# Patient Record
Sex: MALE | Race: WHITE | NOT HISPANIC OR LATINO
[De-identification: names, ages, dates, MRNs, and addresses within clinical notes are randomized per-mention and may not be internally consistent; named-entity substitution may affect disease eponyms.]

---

## 2017-02-22 ENCOUNTER — APPOINTMENT (OUTPATIENT)
Dept: NEUROLOGY | Facility: CLINIC | Age: 62
End: 2017-02-22

## 2017-08-18 ENCOUNTER — APPOINTMENT (OUTPATIENT)
Dept: HEART AND VASCULAR | Facility: CLINIC | Age: 62
End: 2017-08-18
Payer: MEDICARE

## 2017-08-18 VITALS — SYSTOLIC BLOOD PRESSURE: 195 MMHG | HEART RATE: 68 BPM | DIASTOLIC BLOOD PRESSURE: 93 MMHG

## 2017-08-18 VITALS — HEART RATE: 66 BPM | DIASTOLIC BLOOD PRESSURE: 44 MMHG | SYSTOLIC BLOOD PRESSURE: 83 MMHG

## 2017-08-18 DIAGNOSIS — I10 ESSENTIAL (PRIMARY) HYPERTENSION: ICD-10-CM

## 2017-08-18 DIAGNOSIS — I48.0 PAROXYSMAL ATRIAL FIBRILLATION: ICD-10-CM

## 2017-08-18 DIAGNOSIS — E11.9 TYPE 2 DIABETES MELLITUS W/OUT COMPLICATIONS: ICD-10-CM

## 2017-08-18 PROCEDURE — 99205 OFFICE O/P NEW HI 60 MIN: CPT

## 2017-08-18 PROCEDURE — 93000 ELECTROCARDIOGRAM COMPLETE: CPT

## 2017-08-18 RX ORDER — APIXABAN 5 MG/1
5 TABLET, FILM COATED ORAL
Qty: 60 | Refills: 3 | Status: COMPLETED | COMMUNITY
Start: 2017-08-18 | End: 2017-12-16

## 2017-08-18 RX ORDER — METOPROLOL SUCCINATE 100 MG/1
100 TABLET, EXTENDED RELEASE ORAL
Qty: 90 | Refills: 0 | Status: DISCONTINUED | COMMUNITY
Start: 2017-05-25

## 2017-08-18 RX ORDER — DABIGATRAN ETEXILATE MESYLATE 150 MG/1
150 CAPSULE ORAL
Qty: 60 | Refills: 0 | Status: DISCONTINUED | COMMUNITY
Start: 2017-04-01

## 2017-09-15 ENCOUNTER — TRANSCRIPTION ENCOUNTER (OUTPATIENT)
Age: 62
End: 2017-09-15

## 2017-12-22 ENCOUNTER — APPOINTMENT (OUTPATIENT)
Dept: HEART AND VASCULAR | Facility: CLINIC | Age: 62
End: 2017-12-22

## 2018-01-30 ENCOUNTER — APPOINTMENT (OUTPATIENT)
Dept: HEART AND VASCULAR | Facility: CLINIC | Age: 63
End: 2018-01-30

## 2018-02-07 ENCOUNTER — APPOINTMENT (OUTPATIENT)
Dept: OTOLARYNGOLOGY | Facility: CLINIC | Age: 63
End: 2018-02-07
Payer: MEDICARE

## 2018-02-07 VITALS
BODY MASS INDEX: 34.38 KG/M2 | DIASTOLIC BLOOD PRESSURE: 106 MMHG | HEIGHT: 63 IN | WEIGHT: 194 LBS | SYSTOLIC BLOOD PRESSURE: 176 MMHG | HEART RATE: 65 BPM

## 2018-02-07 DIAGNOSIS — D44.2 NEOPLASM OF UNCERTAIN BEHAVIOR OF PARATHYROID GLAND: ICD-10-CM

## 2018-02-07 PROCEDURE — 99204 OFFICE O/P NEW MOD 45 MIN: CPT | Mod: 25

## 2018-02-07 PROCEDURE — 31575 DIAGNOSTIC LARYNGOSCOPY: CPT

## 2018-02-07 RX ORDER — SPIRONOLACTONE 50 MG/1
TABLET ORAL
Refills: 0 | Status: ACTIVE | COMMUNITY

## 2018-02-08 ENCOUNTER — OUTPATIENT (OUTPATIENT)
Dept: OUTPATIENT SERVICES | Facility: HOSPITAL | Age: 63
LOS: 1 days | End: 2018-02-08
Payer: MEDICARE

## 2018-02-08 PROCEDURE — A9512: CPT

## 2018-02-08 PROCEDURE — A9500: CPT

## 2018-02-08 PROCEDURE — 78072 PARATHYRD PLANAR W/SPECT&CT: CPT | Mod: 26

## 2018-02-08 PROCEDURE — 78072 PARATHYRD PLANAR W/SPECT&CT: CPT

## 2018-02-12 ENCOUNTER — APPOINTMENT (OUTPATIENT)
Dept: CT IMAGING | Facility: HOSPITAL | Age: 63
End: 2018-02-12

## 2018-02-12 ENCOUNTER — OUTPATIENT (OUTPATIENT)
Dept: OUTPATIENT SERVICES | Facility: HOSPITAL | Age: 63
LOS: 1 days | End: 2018-02-12
Payer: MEDICARE

## 2018-02-12 PROCEDURE — 70491 CT SOFT TISSUE NECK W/DYE: CPT | Mod: 26

## 2018-02-12 PROCEDURE — 70492 CT SFT TSUE NCK W/O & W/DYE: CPT

## 2018-02-26 VITALS
HEART RATE: 98 BPM | TEMPERATURE: 98 F | HEIGHT: 64 IN | DIASTOLIC BLOOD PRESSURE: 88 MMHG | RESPIRATION RATE: 16 BRPM | SYSTOLIC BLOOD PRESSURE: 156 MMHG

## 2018-02-26 NOTE — PATIENT PROFILE ADULT. - PSH
S/P tonsillectomy    Surgery, elective  Parathyroidectomy 2002 S/P tonsillectomy    Surgery, elective  kidney stone -stent  Surgery, elective  broken leg as a children  Surgery, elective  Parathyroidectomy 2002

## 2018-02-27 ENCOUNTER — OUTPATIENT (OUTPATIENT)
Dept: INPATIENT UNIT | Facility: HOSPITAL | Age: 63
LOS: 1 days | Discharge: HOME | End: 2018-02-27
Payer: MEDICARE

## 2018-02-27 ENCOUNTER — RESULT REVIEW (OUTPATIENT)
Age: 63
End: 2018-02-27

## 2018-02-27 ENCOUNTER — APPOINTMENT (OUTPATIENT)
Dept: OTOLARYNGOLOGY | Facility: CLINIC | Age: 63
End: 2018-02-27

## 2018-02-27 DIAGNOSIS — Z90.89 ACQUIRED ABSENCE OF OTHER ORGANS: Chronic | ICD-10-CM

## 2018-02-27 DIAGNOSIS — E83.51 HYPOCALCEMIA: ICD-10-CM

## 2018-02-27 DIAGNOSIS — Z41.9 ENCOUNTER FOR PROCEDURE FOR PURPOSES OTHER THAN REMEDYING HEALTH STATE, UNSPECIFIED: Chronic | ICD-10-CM

## 2018-02-27 LAB
ANION GAP SERPL CALC-SCNC: 20 MMOL/L — HIGH (ref 5–17)
BASE EXCESS BLDA CALC-SCNC: -1.9 MMOL/L — SIGNIFICANT CHANGE UP (ref -2–3)
BUN SERPL-MCNC: 14 MG/DL — SIGNIFICANT CHANGE UP (ref 7–23)
CA-I BLDA-SCNC: 1.17 MMOL/L — SIGNIFICANT CHANGE UP (ref 1.12–1.3)
CALCIUM SERPL-MCNC: 8.8 MG/DL — SIGNIFICANT CHANGE UP (ref 8.4–10.5)
CHLORIDE SERPL-SCNC: 103 MMOL/L — SIGNIFICANT CHANGE UP (ref 96–108)
CO2 SERPL-SCNC: 19 MMOL/L — LOW (ref 22–31)
COHGB MFR BLDA: 0.3 % — SIGNIFICANT CHANGE UP
CREAT SERPL-MCNC: 0.91 MG/DL — SIGNIFICANT CHANGE UP (ref 0.5–1.3)
GAS PNL BLDA: SIGNIFICANT CHANGE UP
GLUCOSE BLDC GLUCOMTR-MCNC: 144 MG/DL — HIGH (ref 70–99)
GLUCOSE BLDC GLUCOMTR-MCNC: 151 MG/DL — HIGH (ref 70–99)
GLUCOSE BLDC GLUCOMTR-MCNC: 200 MG/DL — HIGH (ref 70–99)
GLUCOSE SERPL-MCNC: 228 MG/DL — HIGH (ref 70–99)
HCO3 BLDA-SCNC: 22 MMOL/L — SIGNIFICANT CHANGE UP (ref 21–28)
HGB BLDA-MCNC: 13.3 G/DL — SIGNIFICANT CHANGE UP (ref 13–17)
METHGB MFR BLDA: 0.5 % — SIGNIFICANT CHANGE UP
O2 CT VFR BLDA CALC: 18.9 ML/DL — SIGNIFICANT CHANGE UP (ref 15–23)
OXYHGB MFR BLDA: 98 % — SIGNIFICANT CHANGE UP (ref 94–100)
PCO2 BLDA: 36 MMHG — SIGNIFICANT CHANGE UP (ref 35–48)
PH BLDA: 7.41 — SIGNIFICANT CHANGE UP (ref 7.35–7.45)
PO2 BLDA: 230 MMHG — HIGH (ref 83–108)
POTASSIUM BLDA-SCNC: 3.8 MMOL/L — SIGNIFICANT CHANGE UP (ref 3.5–4.9)
POTASSIUM SERPL-MCNC: 3.5 MMOL/L — SIGNIFICANT CHANGE UP (ref 3.5–5.3)
POTASSIUM SERPL-SCNC: 3.5 MMOL/L — SIGNIFICANT CHANGE UP (ref 3.5–5.3)
PTH-INTACT IO % DIF SERPL: 14 PG/ML — SIGNIFICANT CHANGE UP (ref 8.5–72.5)
PTH-INTACT IO % DIF SERPL: 20 PG/ML — SIGNIFICANT CHANGE UP (ref 8.5–72.5)
PTH-INTACT IO % DIF SERPL: 92.7 PG/ML — HIGH (ref 8.5–72.5)
SAO2 % BLDA: 99 % — SIGNIFICANT CHANGE UP (ref 95–100)
SODIUM BLDA-SCNC: 136 MMOL/L — LOW (ref 138–146)
SODIUM SERPL-SCNC: 142 MMOL/L — SIGNIFICANT CHANGE UP (ref 135–145)

## 2018-02-27 PROCEDURE — 60500 EXPLORE PARATHYROID GLANDS: CPT

## 2018-02-27 RX ORDER — GLUCAGON INJECTION, SOLUTION 0.5 MG/.1ML
1 INJECTION, SOLUTION SUBCUTANEOUS ONCE
Qty: 0 | Refills: 0 | Status: DISCONTINUED | OUTPATIENT
Start: 2018-02-27 | End: 2018-02-28

## 2018-02-27 RX ORDER — AMLODIPINE BESYLATE 2.5 MG/1
10 TABLET ORAL DAILY
Qty: 0 | Refills: 0 | Status: DISCONTINUED | OUTPATIENT
Start: 2018-02-27 | End: 2018-02-28

## 2018-02-27 RX ORDER — LISINOPRIL 2.5 MG/1
40 TABLET ORAL DAILY
Qty: 0 | Refills: 0 | Status: DISCONTINUED | OUTPATIENT
Start: 2018-02-27 | End: 2018-02-28

## 2018-02-27 RX ORDER — METOPROLOL TARTRATE 50 MG
75 TABLET ORAL AT BEDTIME
Qty: 0 | Refills: 0 | Status: DISCONTINUED | OUTPATIENT
Start: 2018-02-27 | End: 2018-02-28

## 2018-02-27 RX ORDER — INSULIN LISPRO 100/ML
VIAL (ML) SUBCUTANEOUS
Qty: 0 | Refills: 0 | Status: DISCONTINUED | OUTPATIENT
Start: 2018-02-27 | End: 2018-02-28

## 2018-02-27 RX ORDER — APIXABAN 2.5 MG/1
1 TABLET, FILM COATED ORAL
Qty: 0 | Refills: 0 | COMMUNITY

## 2018-02-27 RX ORDER — DEXTROSE 50 % IN WATER 50 %
1 SYRINGE (ML) INTRAVENOUS ONCE
Qty: 0 | Refills: 0 | Status: DISCONTINUED | OUTPATIENT
Start: 2018-02-27 | End: 2018-02-28

## 2018-02-27 RX ORDER — DEXTROSE 50 % IN WATER 50 %
25 SYRINGE (ML) INTRAVENOUS ONCE
Qty: 0 | Refills: 0 | Status: DISCONTINUED | OUTPATIENT
Start: 2018-02-27 | End: 2018-02-28

## 2018-02-27 RX ORDER — AMLODIPINE BESYLATE AND BENAZEPRIL HYDROCHLORIDE 10; 20 MG/1; MG/1
1 CAPSULE ORAL
Qty: 0 | Refills: 0 | COMMUNITY

## 2018-02-27 RX ORDER — CALCIUM CARBONATE 500(1250)
2 TABLET ORAL EVERY 8 HOURS
Qty: 0 | Refills: 0 | Status: DISCONTINUED | OUTPATIENT
Start: 2018-02-27 | End: 2018-02-28

## 2018-02-27 RX ORDER — METFORMIN HYDROCHLORIDE 850 MG/1
1 TABLET ORAL
Qty: 0 | Refills: 0 | COMMUNITY

## 2018-02-27 RX ORDER — OXYCODONE AND ACETAMINOPHEN 5; 325 MG/1; MG/1
1 TABLET ORAL EVERY 4 HOURS
Qty: 0 | Refills: 0 | Status: DISCONTINUED | OUTPATIENT
Start: 2018-02-27 | End: 2018-02-28

## 2018-02-27 RX ORDER — METOPROLOL TARTRATE 50 MG
5 TABLET ORAL EVERY 6 HOURS
Qty: 0 | Refills: 0 | Status: DISCONTINUED | OUTPATIENT
Start: 2018-02-27 | End: 2018-02-28

## 2018-02-27 RX ORDER — METOPROLOL TARTRATE 50 MG
100 TABLET ORAL DAILY
Qty: 0 | Refills: 0 | Status: DISCONTINUED | OUTPATIENT
Start: 2018-02-27 | End: 2018-02-28

## 2018-02-27 RX ORDER — ATORVASTATIN CALCIUM 80 MG/1
10 TABLET, FILM COATED ORAL AT BEDTIME
Qty: 0 | Refills: 0 | Status: DISCONTINUED | OUTPATIENT
Start: 2018-02-27 | End: 2018-02-28

## 2018-02-27 RX ORDER — DEXTROSE 50 % IN WATER 50 %
12.5 SYRINGE (ML) INTRAVENOUS ONCE
Qty: 0 | Refills: 0 | Status: DISCONTINUED | OUTPATIENT
Start: 2018-02-27 | End: 2018-02-28

## 2018-02-27 RX ORDER — METOPROLOL TARTRATE 50 MG
25 TABLET ORAL EVERY 6 HOURS
Qty: 0 | Refills: 0 | Status: DISCONTINUED | OUTPATIENT
Start: 2018-02-27 | End: 2018-02-28

## 2018-02-27 RX ORDER — SODIUM CHLORIDE 9 MG/ML
1000 INJECTION, SOLUTION INTRAVENOUS
Qty: 0 | Refills: 0 | Status: DISCONTINUED | OUTPATIENT
Start: 2018-02-27 | End: 2018-02-28

## 2018-02-27 RX ORDER — SPIRONOLACTONE 25 MG/1
50 TABLET, FILM COATED ORAL DAILY
Qty: 0 | Refills: 0 | Status: DISCONTINUED | OUTPATIENT
Start: 2018-02-27 | End: 2018-02-28

## 2018-02-27 RX ORDER — CYST/ALA/Q10/PHOS.SER/DHA/BROC 100-20-50
0 POWDER (GRAM) ORAL
Qty: 0 | Refills: 0 | COMMUNITY

## 2018-02-27 RX ORDER — ONDANSETRON 8 MG/1
4 TABLET, FILM COATED ORAL EVERY 6 HOURS
Qty: 0 | Refills: 0 | Status: DISCONTINUED | OUTPATIENT
Start: 2018-02-27 | End: 2018-02-28

## 2018-02-27 RX ORDER — METOPROLOL TARTRATE 50 MG
0 TABLET ORAL
Qty: 0 | Refills: 0 | COMMUNITY

## 2018-02-27 RX ORDER — ACETAMINOPHEN 500 MG
325 TABLET ORAL EVERY 4 HOURS
Qty: 0 | Refills: 0 | Status: DISCONTINUED | OUTPATIENT
Start: 2018-02-27 | End: 2018-02-28

## 2018-02-27 RX ORDER — SPIRONOLACTONE 25 MG/1
0 TABLET, FILM COATED ORAL
Qty: 0 | Refills: 0 | COMMUNITY

## 2018-02-27 RX ORDER — SODIUM CHLORIDE 9 MG/ML
1000 INJECTION INTRAMUSCULAR; INTRAVENOUS; SUBCUTANEOUS
Qty: 0 | Refills: 0 | Status: DISCONTINUED | OUTPATIENT
Start: 2018-02-27 | End: 2018-02-28

## 2018-02-27 RX ORDER — ATORVASTATIN CALCIUM 80 MG/1
1 TABLET, FILM COATED ORAL
Qty: 0 | Refills: 0 | COMMUNITY

## 2018-02-27 RX ADMIN — ATORVASTATIN CALCIUM 10 MILLIGRAM(S): 80 TABLET, FILM COATED ORAL at 23:10

## 2018-02-27 RX ADMIN — Medication 1: at 22:47

## 2018-02-27 RX ADMIN — Medication 50 MILLIGRAM(S): at 23:12

## 2018-02-27 NOTE — H&P ADULT - HISTORY OF PRESENT ILLNESS
62M admitted for parathyroidectomy (Left lower removed). This is the 2nd parathyroidectomy for Mr Becki after the 1st parathyroidectomy 16 years ago for primary hyperparathyroidism where his right upper and lower parathyroid was removed.     Pt had been doing well until recently when it was noted that he was borderline hypercalcemic again and had kidney stones.     Now s/p parathyroidectomy with RLN monitoring. RLN stimulating as expected at the end of the case. 62M admitted for parathyroidectomy (Left lower removed). This is the 2nd parathyroidectomy for Mr Becki after the 1st parathyroidectomy 16 years ago for primary hyperparathyroidism where his right upper and lower parathyroid was removed.     Pt had been doing well until recently when it was noted that he was borderline hypercalcemic again and had kidney stones.     Now s/p parathyroidectomy with RLN monitoring. RLN stimulating as expected at the end of the case.       Postop check   NAD, ORTEZ, AAO3, voice strong  no chvostek, no perioral numbness or tingling in extremities  voided 400cc  DIXON holding suction  neck flat  minimal SS in DIXON  incision c.d.i    a/p: 62M s/p parathyroidectomy  - PO reg  - resume home meds  - eliquis to start POD2  - routine DIXON care  - 9pm ca  - monitor for signs of hypocalcemia and neck hematoma  - ancef while drain is in  - dispo: SDU  - mgmt discussed and agreed upon by Dr Guajardo

## 2018-02-27 NOTE — H&P ADULT - PSH
S/P tonsillectomy    Surgery, elective  kidney stone -stent  Surgery, elective  broken leg as a children  Surgery, elective  Parathyroidectomy 2002

## 2018-02-28 ENCOUNTER — TRANSCRIPTION ENCOUNTER (OUTPATIENT)
Age: 63
End: 2018-02-28

## 2018-02-28 VITALS — TEMPERATURE: 98 F

## 2018-02-28 LAB
CALCIUM SERPL-MCNC: 10.3 MG/DL — SIGNIFICANT CHANGE UP (ref 8.4–10.5)
GLUCOSE BLDC GLUCOMTR-MCNC: 193 MG/DL — HIGH (ref 70–99)
GLUCOSE BLDC GLUCOMTR-MCNC: 240 MG/DL — HIGH (ref 70–99)
GLUCOSE BLDC GLUCOMTR-MCNC: 278 MG/DL — HIGH (ref 70–99)
HBA1C BLD-MCNC: 6.9 % — HIGH (ref 4–5.6)
SURGICAL PATHOLOGY STUDY: SIGNIFICANT CHANGE UP

## 2018-02-28 PROCEDURE — 84132 ASSAY OF SERUM POTASSIUM: CPT

## 2018-02-28 PROCEDURE — 85018 HEMOGLOBIN: CPT

## 2018-02-28 PROCEDURE — 82310 ASSAY OF CALCIUM: CPT

## 2018-02-28 PROCEDURE — 80048 BASIC METABOLIC PNL TOTAL CA: CPT

## 2018-02-28 PROCEDURE — 82330 ASSAY OF CALCIUM: CPT

## 2018-02-28 PROCEDURE — 84295 ASSAY OF SERUM SODIUM: CPT

## 2018-02-28 PROCEDURE — 60500 EXPLORE PARATHYROID GLANDS: CPT | Mod: LT

## 2018-02-28 PROCEDURE — 82962 GLUCOSE BLOOD TEST: CPT

## 2018-02-28 PROCEDURE — 36415 COLL VENOUS BLD VENIPUNCTURE: CPT

## 2018-02-28 PROCEDURE — 83970 ASSAY OF PARATHORMONE: CPT

## 2018-02-28 PROCEDURE — 88305 TISSUE EXAM BY PATHOLOGIST: CPT

## 2018-02-28 PROCEDURE — 88331 PATH CONSLTJ SURG 1 BLK 1SPC: CPT

## 2018-02-28 PROCEDURE — 83036 HEMOGLOBIN GLYCOSYLATED A1C: CPT

## 2018-02-28 RX ORDER — ACETAMINOPHEN 500 MG
1 TABLET ORAL
Qty: 0 | Refills: 0 | COMMUNITY
Start: 2018-02-28

## 2018-02-28 RX ORDER — AMLODIPINE BESYLATE 2.5 MG/1
1 TABLET ORAL
Qty: 0 | Refills: 0 | COMMUNITY
Start: 2018-02-28

## 2018-02-28 RX ORDER — CALCIUM CARBONATE 500(1250)
2 TABLET ORAL
Qty: 0 | Refills: 0 | COMMUNITY
Start: 2018-02-28

## 2018-02-28 RX ORDER — HYDROCORTISONE 1 %
1 OINTMENT (GRAM) TOPICAL
Qty: 0 | Refills: 0 | Status: DISCONTINUED | OUTPATIENT
Start: 2018-02-28 | End: 2018-02-28

## 2018-02-28 RX ORDER — ACETAMINOPHEN WITH CODEINE 300MG-30MG
1 TABLET ORAL
Qty: 12 | Refills: 0 | OUTPATIENT
Start: 2018-02-28

## 2018-02-28 RX ADMIN — Medication 325 MILLIGRAM(S): at 04:16

## 2018-02-28 RX ADMIN — Medication 325 MILLIGRAM(S): at 03:16

## 2018-02-28 RX ADMIN — Medication 2: at 11:49

## 2018-02-28 RX ADMIN — Medication 1: at 08:39

## 2018-02-28 RX ADMIN — AMLODIPINE BESYLATE 10 MILLIGRAM(S): 2.5 TABLET ORAL at 09:11

## 2018-02-28 RX ADMIN — OXYCODONE AND ACETAMINOPHEN 1 TABLET(S): 5; 325 TABLET ORAL at 10:33

## 2018-02-28 RX ADMIN — LISINOPRIL 40 MILLIGRAM(S): 2.5 TABLET ORAL at 09:11

## 2018-02-28 RX ADMIN — Medication 1 APPLICATION(S): at 10:34

## 2018-02-28 RX ADMIN — Medication 3: at 18:38

## 2018-02-28 RX ADMIN — Medication 100 MILLIGRAM(S): at 06:32

## 2018-02-28 RX ADMIN — OXYCODONE AND ACETAMINOPHEN 1 TABLET(S): 5; 325 TABLET ORAL at 11:00

## 2018-02-28 NOTE — DISCHARGE NOTE ADULT - PATIENT PORTAL LINK FT
You can access the [facility] Patient Portal, offered by [facility], by registering with the following website: [email or web address]

## 2018-02-28 NOTE — DISCHARGE NOTE ADULT - MEDICATION SUMMARY - MEDICATIONS TO TAKE
I will START or STAY ON the medications listed below when I get home from the hospital:    spironolactone 50 mg oral tablet  -- orally once a day  -- Indication: For HTN (hypertension)    acetaminophen 325 mg oral tablet  -- 1 tab(s) by mouth every 4 hours, As needed, Mild Pain (1 - 3)  -- Indication: For PRN    calcium carbonate 1250 mg (500 mg elemental calcium) oral tablet  -- 2 tab(s) by mouth every 8 hours, As needed, Signs of hypocalcemia  -- Indication: For Hypocalcemia    Eliquis 5 mg oral tablet  -- 1 tab(s) by mouth 2 times a day  -- Indication: For Atrial fibrillation    metFORMIN 500 mg oral tablet  -- 1 tab(s) by mouth 2 times a day  -- Indication: For DM (diabetes mellitus)    Lipitor 10 mg oral tablet  -- 1 tab(s) by mouth once a day  -- Indication: For DM (diabetes mellitus)    amlodipine-benazepril 10 mg-40 mg oral capsule  -- 1 cap(s) by mouth once a day  -- Indication: For HTN (hypertension)    metoprolol tartrate 75 mg oral tablet  -- orally once a day (at bedtime)  -- Indication: For HTN (hypertension)    metoprolol tartrate 100 mg oral tablet  -- orally once a day  -- Indication: For HTN (hypertension)    amLODIPine 10 mg oral tablet  -- 1 tab(s) by mouth once a day  -- Indication: For HTN (hypertension)    ubiquinol  -- orally once a day  -- Indication: For Home medication

## 2018-02-28 NOTE — DISCHARGE NOTE ADULT - ADDITIONAL INSTRUCTIONS
1.	Report any fever, chills, difficulty breathing, difficulty swallowing, and change in your voice, bleeding or purulent drainage from your incision sites, or any significant swelling to your neck to the doctor immediately.  2.	Report any numbness/tingling to toes or lips to your doctor immediately. If you have numbness or tingling in your fingers/lips take 2 tums or 2 tablets of calcium supplementation and call your doctor immediately.   3.	Diet: soft/mechanical soft diet, no sharp foods, no extensive chewing of steaks or hard meats ect. You can resume a normal diet once your throat feels back to normal.  4.	Activity: no heavy lifting, do not lift anything heavier than a gallon of milk until after you follow up appointment with your doctor, no strenuous activity such as running or biking.  5.	Shower/Bathing: you shower starting 48 hrs after you procedure, after 48 hrs you may wash your hair and shower but do not scrub at your neck incision  6.	Wound care: keep your incision site clean and dry   7.	Take all medications as prescribed.   8.	Continue all of your normal home medications unless told otherwise.  9.	Avoid any NSAIDS or ibuprofen/asa containing products you may take Tylenol for mild pain.  10. F/u with Dr. Guajardo on Wednesday 3/7, call to confirm time

## 2018-02-28 NOTE — DISCHARGE NOTE ADULT - CARE PLAN
Principal Discharge DX:	Hyperparathyroidism  Goal:	rehabilitation  Assessment and plan of treatment:	-f/u with Dr. Guajardo as scheduled

## 2018-02-28 NOTE — DISCHARGE NOTE ADULT - CARE PROVIDER_API CALL
Vanessa Allred), [facility]; Otolaryngology  [address]  [address] [place] [address]  Phone: [phone]  Fax: [phone]

## 2018-02-28 NOTE — PROGRESS NOTE ADULT - SUBJECTIVE AND OBJECTIVE BOX
ENT [facility] DAILY PROGRESS NOTE    Overnight events/Interval HPI:   62M admitted for parathyroidectomy (Left lower removed). This is the 2nd parathyroidectomy for Mr Becki after the 1st parathyroidectomy 16 years ago for primary hyperparathyroidism where his right upper and lower parathyroid was removed.     Pt had been doing well until recently when it was noted that he was borderline hypercalcemic again and had kidney stones.     Now s/p parathyroidectomy with RLN monitoring. RLN stimulating as expected at the end of the case.   2/28: DAYANARA overnight, Calcium levels WNL x2, tolerating regular diet.  Patient did notice some erythema of left hand of unclear etiology.  He denies pain or itchiness at site of erythema but did initially notice some associated edema of left hand which has since resolved.        Allergies    Septra (Rash)  sulfa drugs (Unknown)    Intolerances      Vital Signs Last 24 Hrs  T(C): 37.1 (28 Feb 2018 09:17), Max: 37.1 (27 Feb 2018 19:55)  T(F): 98.8 (28 Feb 2018 09:17), Max: 98.8 (27 Feb 2018 19:55)  HR: 70 (28 Feb 2018 12:30) (70 - 100)  BP: 139/78 (28 Feb 2018 12:30) (139/78 - 180/100)  BP(mean): 103 (28 Feb 2018 12:30) (103 - 128)  RR: 18 (28 Feb 2018 12:30) (11 - 23)  SpO2: 94% (28 Feb 2018 12:30) (94% - 98%)            PHYSICAL EXAM:  NAD, ORTEZ, AAO3, voice strong  no chvostek, no perioral numbness or tingling in extremities  voided 400cc  DIXON holding suction x2  neck flat  minimal SS in DIXON  incision c.d.i    LABS:  CBC-    BMP/CMP-  27 Feb 2018 21:12    142    |  103    |  14     ----------------------------<  228    3.5     |  19     |  0.91     Ca    10.3       28 Feb 2018 05:52      Coagulation Studies-    Endocrine Panel-  Calcium, Total Serum: 10.3 mg/dL (02-28 @ 05:52)  Calcium, Total Serum: 8.8 mg/dL (02-27 @ 21:12)    PTH Intact, Intraoperative.: 14.0 pg/mL (02-27 @ 18:05)  PTH Intact, Intraoperative.: 20.0 pg/mL (02-27 @ 18:03)  PTH Intact, Intraoperative.: 92.7 pg/mL <H> (02-27 @ 15:36)          Assessment/Plan:    62M s/p parathyroidectomy  - PO reg  - resume home meds  - eliquis to start POD2  - routine DIXON care, R DIXON removed, will continue to monitor output of L DIXON  - monitor for signs of hypocalcemia and neck hematoma  - ancef while drain is in  - dispo: SDU  - mgmt discussed and agreed upon by Dr Guajardo

## 2018-02-28 NOTE — DISCHARGE NOTE ADULT - HOSPITAL COURSE
62M admitted for parathyroidectomy (Left lower removed). This is the 2nd parathyroidectomy for Mr Becki after the 1st parathyroidectomy 16 years ago for primary hyperparathyroidism where his right upper and lower parathyroid was removed.     Pt had been doing well until recently when it was noted that he was borderline hypercalcemic again and had kidney stones.     Now s/p parathyroidectomy with RLN monitoring. RLN stimulating as expected at the end of the case.   2/28: DAYANARA overnight, Calcium levels WNL x2, tolerating regular diet.  Patient did notice some erythema of left hand of unclear etiology.  He denies pain or itchiness at site of erythema but did initially notice some associated edema of left hand which has since resolved.

## 2018-03-01 PROBLEM — I10 ESSENTIAL (PRIMARY) HYPERTENSION: Chronic | Status: ACTIVE | Noted: 2018-02-26

## 2018-03-01 PROBLEM — I48.91 UNSPECIFIED ATRIAL FIBRILLATION: Chronic | Status: ACTIVE | Noted: 2018-02-26

## 2018-03-01 PROBLEM — E11.9 TYPE 2 DIABETES MELLITUS WITHOUT COMPLICATIONS: Chronic | Status: ACTIVE | Noted: 2018-02-26

## 2018-03-02 PROBLEM — E83.51 HYPOCALCEMIA: Status: ACTIVE | Noted: 2018-03-02

## 2018-03-06 ENCOUNTER — APPOINTMENT (OUTPATIENT)
Dept: OTOLARYNGOLOGY | Facility: CLINIC | Age: 63
End: 2018-03-06
Payer: MEDICARE

## 2018-03-06 VITALS
WEIGHT: 193 LBS | DIASTOLIC BLOOD PRESSURE: 110 MMHG | BODY MASS INDEX: 34.2 KG/M2 | HEART RATE: 90 BPM | HEIGHT: 63 IN | SYSTOLIC BLOOD PRESSURE: 181 MMHG | TEMPERATURE: 98.5 F

## 2018-03-06 DIAGNOSIS — R49.0 DYSPHONIA: ICD-10-CM

## 2018-03-06 DIAGNOSIS — R13.10 DYSPHAGIA, UNSPECIFIED: ICD-10-CM

## 2018-03-06 PROCEDURE — 31575 DIAGNOSTIC LARYNGOSCOPY: CPT | Mod: 79

## 2018-04-04 ENCOUNTER — RX RENEWAL (OUTPATIENT)
Age: 63
End: 2018-04-04

## 2018-07-09 ENCOUNTER — APPOINTMENT (OUTPATIENT)
Dept: OTOLARYNGOLOGY | Facility: CLINIC | Age: 63
End: 2018-07-09
Payer: MEDICARE

## 2018-07-09 VITALS
HEART RATE: 58 BPM | SYSTOLIC BLOOD PRESSURE: 127 MMHG | HEIGHT: 63 IN | BODY MASS INDEX: 35.97 KG/M2 | WEIGHT: 203 LBS | DIASTOLIC BLOOD PRESSURE: 84 MMHG

## 2018-07-09 DIAGNOSIS — E21.3 HYPERPARATHYROIDISM, UNSPECIFIED: ICD-10-CM

## 2018-07-09 DIAGNOSIS — D35.1 BENIGN NEOPLASM OF PARATHYROID GLAND: ICD-10-CM

## 2018-07-09 PROCEDURE — 99214 OFFICE O/P EST MOD 30 MIN: CPT | Mod: 25

## 2018-07-09 PROCEDURE — 31575 DIAGNOSTIC LARYNGOSCOPY: CPT

## 2018-07-10 PROBLEM — D35.1 BENIGN NEOPLASM OF PARATHYROID GLAND: Status: ACTIVE | Noted: 2018-07-10

## 2018-07-10 PROBLEM — E21.3 HYPERPARATHYROIDISM: Status: ACTIVE | Noted: 2018-02-07

## 2018-08-03 ENCOUNTER — OTHER (OUTPATIENT)
Age: 63
End: 2018-08-03

## 2018-08-03 RX ORDER — APIXABAN 5 MG/1
5 TABLET, FILM COATED ORAL
Qty: 60 | Refills: 5 | Status: ACTIVE | COMMUNITY
Start: 2017-12-05 | End: 1900-01-01

## 2019-06-20 ENCOUNTER — TRANSCRIPTION ENCOUNTER (OUTPATIENT)
Age: 64
End: 2019-06-20

## 2019-07-04 ENCOUNTER — TRANSCRIPTION ENCOUNTER (OUTPATIENT)
Age: 64
End: 2019-07-04

## 2019-07-21 ENCOUNTER — TRANSCRIPTION ENCOUNTER (OUTPATIENT)
Age: 64
End: 2019-07-21

## 2019-07-24 ENCOUNTER — APPOINTMENT (OUTPATIENT)
Dept: PAIN MANAGEMENT | Facility: CLINIC | Age: 64
End: 2019-07-24

## 2020-05-18 ENCOUNTER — APPOINTMENT (OUTPATIENT)
Dept: GASTROENTEROLOGY | Facility: CLINIC | Age: 65
End: 2020-05-18

## 2022-07-19 ENCOUNTER — APPOINTMENT (OUTPATIENT)
Dept: UROLOGY | Facility: CLINIC | Age: 67
End: 2022-07-19

## 2022-07-19 VITALS
WEIGHT: 185 LBS | SYSTOLIC BLOOD PRESSURE: 125 MMHG | HEIGHT: 63 IN | TEMPERATURE: 98.2 F | DIASTOLIC BLOOD PRESSURE: 73 MMHG | HEART RATE: 65 BPM | RESPIRATION RATE: 17 BRPM | BODY MASS INDEX: 32.78 KG/M2

## 2022-07-19 DIAGNOSIS — Z78.9 OTHER SPECIFIED HEALTH STATUS: ICD-10-CM

## 2022-07-19 DIAGNOSIS — Z80.42 FAMILY HISTORY OF MALIGNANT NEOPLASM OF PROSTATE: ICD-10-CM

## 2022-07-19 DIAGNOSIS — R39.9 UNSPECIFIED SYMPTOMS AND SIGNS INVOLVING THE GENITOURINARY SYSTEM: ICD-10-CM

## 2022-07-19 DIAGNOSIS — N28.9 DISORDER OF KIDNEY AND URETER, UNSPECIFIED: ICD-10-CM

## 2022-07-19 PROCEDURE — 51798 US URINE CAPACITY MEASURE: CPT

## 2022-07-19 PROCEDURE — 99203 OFFICE O/P NEW LOW 30 MIN: CPT

## 2022-07-19 NOTE — REVIEW OF SYSTEMS
[Seen by urologist before (Name)  ___] : Preciously seen by a urologist: [unfilled] [History of kidney stones] : history of kidney stones [Wake up at night to urinate  How many times?  ___] : wakes up to urinate [unfilled] times during the night [Joint Pain] : joint pain [Joint Swelling] : joint swelling [see HPI] : see HPI [Negative] : Musculoskeletal

## 2022-07-19 NOTE — PHYSICAL EXAM
[General Appearance - Well Developed] : well developed [General Appearance - Well Nourished] : well nourished [Heart Rate And Rhythm] : Heart rate and rhythm were normal [] : no respiratory distress [Respiration, Rhythm And Depth] : normal respiratory rhythm and effort [Bowel Sounds] : normal bowel sounds [Abdomen Soft] : soft [Normal Station and Gait] : the gait and station were normal for the patient's age [Skin Color & Pigmentation] : normal skin color and pigmentation [Skin Turgor] : supple [No Focal Deficits] : no focal deficits [Not Anxious] : not anxious [Oriented To Time, Place, And Person] : oriented to person, place, and time

## 2022-07-20 LAB — PSA SERPL-MCNC: 0.78 NG/ML

## 2022-07-21 ENCOUNTER — OUTPATIENT (OUTPATIENT)
Dept: OUTPATIENT SERVICES | Facility: HOSPITAL | Age: 67
LOS: 1 days | End: 2022-07-21
Payer: MEDICARE

## 2022-07-21 ENCOUNTER — APPOINTMENT (OUTPATIENT)
Dept: CT IMAGING | Facility: IMAGING CENTER | Age: 67
End: 2022-07-21

## 2022-07-21 DIAGNOSIS — N28.9 DISORDER OF KIDNEY AND URETER, UNSPECIFIED: ICD-10-CM

## 2022-07-21 DIAGNOSIS — Z41.9 ENCOUNTER FOR PROCEDURE FOR PURPOSES OTHER THAN REMEDYING HEALTH STATE, UNSPECIFIED: Chronic | ICD-10-CM

## 2022-07-21 DIAGNOSIS — Z90.89 ACQUIRED ABSENCE OF OTHER ORGANS: Chronic | ICD-10-CM

## 2022-07-21 PROCEDURE — 82565 ASSAY OF CREATININE: CPT

## 2022-07-21 PROCEDURE — 74178 CT ABD&PLV WO CNTR FLWD CNTR: CPT

## 2022-07-21 PROCEDURE — 74178 CT ABD&PLV WO CNTR FLWD CNTR: CPT | Mod: 26,MH

## 2022-08-11 ENCOUNTER — RX CHANGE (OUTPATIENT)
Age: 67
End: 2022-08-11

## 2022-08-12 DIAGNOSIS — K86.2 CYST OF PANCREAS: ICD-10-CM

## 2022-08-12 RX ORDER — OXYBUTYNIN CHLORIDE 5 MG/1
5 TABLET ORAL
Qty: 90 | Refills: 3 | Status: ACTIVE | COMMUNITY
Start: 2022-07-19 | End: 1900-01-01

## 2022-08-12 NOTE — HISTORY OF PRESENT ILLNESS
[Nocturia] : nocturia [FreeTextEntry1] : : Aug 29 1955 \par Referring Provider: none \par \par HPI: Mr. HEATHER CASTELLON is a 66 year yo M with a PMHx notable for nocturnal enuresis, nocturia x 2-3x until last 2 hours then another 3-4 times. During the daytime, no issues with frequency (normal intervals per patient). Often complains that within 0.5hr he has to go again. Stream quality is slower, but not slow. Only sometimes feels like he doesn't empty all the way out. Has a prior history of kidney stones that Dr. Mueller had seen in /. \par \par Also has previous 2018 CT imaging from [facility] with small R sided proteinaceous cyst vs lesion as well as multiple 4-5 stones bilaterally all under 5 mm. \par \par PVR 1 cc. \par \par Anticoagulation: Eliquis (afib)\par All: septra\par Social: never smoker, non drinker, no children, not , retired state \par PMHx: scilntillating scotoma, HTN, DM, arthritis, afib\par FHx: mother with pancreatic cancer  , father with prostate cancer, brother skin cancer\par PSHx: tonsillectomy, parathyroid resection\par Labs: Last PSA  was 0.69, A1c 7.3 6 months ago. PSA now 0.8; Cr 1.06\par Imaging: small R sided ?proteinaceous cyst with nonobstructing stones 4-5 bilaterally several mm wide. [facility] [Urinary Incontinence] : no urinary incontinence [Urinary Retention] : no urinary retention [Urinary Urgency] : no urinary urgency [Urinary Frequency] : no urinary frequency

## 2022-08-16 ENCOUNTER — APPOINTMENT (OUTPATIENT)
Dept: UROLOGY | Facility: CLINIC | Age: 67
End: 2022-08-16

## 2022-08-16 DIAGNOSIS — N20.0 CALCULUS OF KIDNEY: ICD-10-CM

## 2022-08-16 PROCEDURE — 99212 OFFICE O/P EST SF 10 MIN: CPT

## 2022-08-16 PROCEDURE — 51798 US URINE CAPACITY MEASURE: CPT

## 2022-08-16 NOTE — REVIEW OF SYSTEMS
[see HPI] : see HPI [Seen by urologist before (Name)  ___] : Preciously seen by a urologist: [unfilled] [History of kidney stones] : history of kidney stones [Wake up at night to urinate  How many times?  ___] : wakes up to urinate [unfilled] times during the night [Joint Pain] : joint pain [Joint Swelling] : joint swelling [Negative] : Heme/Lymph

## 2022-08-18 LAB — BACTERIA UR CULT: NORMAL

## 2022-09-08 ENCOUNTER — OUTPATIENT (OUTPATIENT)
Dept: OUTPATIENT SERVICES | Facility: HOSPITAL | Age: 67
LOS: 1 days | End: 2022-09-08
Payer: MEDICARE

## 2022-09-08 ENCOUNTER — APPOINTMENT (OUTPATIENT)
Dept: MRI IMAGING | Facility: IMAGING CENTER | Age: 67
End: 2022-09-08

## 2022-09-08 DIAGNOSIS — Z41.9 ENCOUNTER FOR PROCEDURE FOR PURPOSES OTHER THAN REMEDYING HEALTH STATE, UNSPECIFIED: Chronic | ICD-10-CM

## 2022-09-08 DIAGNOSIS — Z90.89 ACQUIRED ABSENCE OF OTHER ORGANS: Chronic | ICD-10-CM

## 2022-09-08 DIAGNOSIS — K86.2 CYST OF PANCREAS: ICD-10-CM

## 2022-09-08 PROCEDURE — A9585: CPT

## 2022-09-08 PROCEDURE — 74183 MRI ABD W/O CNTR FLWD CNTR: CPT | Mod: 26,MH

## 2022-09-08 PROCEDURE — 74183 MRI ABD W/O CNTR FLWD CNTR: CPT

## 2022-09-16 NOTE — HISTORY OF PRESENT ILLNESS
[Nocturia] : nocturia [FreeTextEntry1] : : Aug 29 1955 \par Referring Provider: none \par \par HPI: Mr. HEATHER CASTELLON is a 66 year yo M with a PMHx notable for nocturnal enuresis, nocturia x 2-3x until last 2 hours then another 3-4 times. During the daytime, no issues with frequency (normal intervals per patient). Often complains that within 0.5hr he has to go again. Stream quality is slower, but not slow. Only sometimes feels like he doesn't empty all the way out. Has a prior history of kidney stones that Dr. Mueller had seen in . \par \par Also has previous 2018 CT imaging from [facility] with small R sided proteinaceous cyst vs lesion as well as multiple 4-5 stones bilaterally all under 5 mm. \par \par PVR 1 cc. \par \par Anticoagulation: Eliquis (afib)\par All: septra\par Social: never smoker, non drinker, no children, not , retired state \par PMHx: scilntillating scotoma, HTN, DM, arthritis, afib\par FHx: mother with pancreatic cancer  , father with prostate cancer, brother skin cancer\par PSHx: tonsillectomy, parathyroid resection\par Labs: Last PSA  was 0.69, A1c 7.3 6 months ago. PSA now 0.8; Cr 1.06\par Imaging: small R sided ?proteinaceous cyst with nonobstructing stones 4-5 bilaterally several mm wide. [facility]\par \par :\par Here today with CT scan with a 7 mm L sided kidney stone. The nocturia has improved with the oxybuytnin. No blood in the urine. No dry mouth and constipation as a result of the ditropan. CT abdomen notable for pancreatic cyst and follow up MR abdomen ordered per their recommendations.  PVR 29 cc. [Urinary Incontinence] : no urinary incontinence [Urinary Retention] : no urinary retention [Urinary Urgency] : no urinary urgency [Urinary Frequency] : no urinary frequency

## 2022-11-02 ENCOUNTER — APPOINTMENT (OUTPATIENT)
Dept: UROLOGY | Facility: HOSPITAL | Age: 67
End: 2022-11-02

## 2023-01-06 ENCOUNTER — APPOINTMENT (OUTPATIENT)
Dept: ORTHOPEDIC SURGERY | Facility: CLINIC | Age: 68
End: 2023-01-06
Payer: OTHER MISCELLANEOUS

## 2023-01-06 VITALS — WEIGHT: 179 LBS | BODY MASS INDEX: 31.71 KG/M2 | HEIGHT: 63 IN

## 2023-01-06 DIAGNOSIS — M17.11 UNILATERAL PRIMARY OSTEOARTHRITIS, RIGHT KNEE: ICD-10-CM

## 2023-01-06 PROCEDURE — 99204 OFFICE O/P NEW MOD 45 MIN: CPT | Mod: 25

## 2023-01-06 PROCEDURE — 20610 DRAIN/INJ JOINT/BURSA W/O US: CPT | Mod: RT

## 2023-01-06 PROCEDURE — 73564 X-RAY EXAM KNEE 4 OR MORE: CPT | Mod: RT

## 2023-01-06 PROCEDURE — 99072 ADDL SUPL MATRL&STAF TM PHE: CPT

## 2023-01-06 RX ORDER — HYALURONATE SODIUM 20 MG/2 ML
20 SYRINGE (ML) INTRAARTICULAR
Qty: 1 | Refills: 0 | Status: ACTIVE | COMMUNITY
Start: 2023-01-06
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
## 2023-01-06 NOTE — DISCUSSION/SUMMARY
[de-identified] : This patient is severe bone-on-bone right knee osteoarthritis.  I had a discussion with the patient, who is a candidate for total knee replacement. Risks, benefits and alternatives were discussed. At this point, the patient wants to hold off as the pain is not quite severe enough and because he is fearful of surgery.  An extensive discussion was conducted on the natural history of the disease and the variety of surgical and non-surgical options available to the patient including, but not limited to non-steroidal anti-inflammatory medications, steroid injections, physical therapy, maintenance of ideal body weight, and reduction of activity.  Today we performed a right knee intra-articular cortisone injection.  I will apply to the insurance company authorization for Euflexxa injection series. The patient will schedule an appointment as needed.\par \par Informed consent for the right knee injection was obtained. All questions were answered. A time out was performed. The right knee was prepped and draped in sterile fashion. Using sterile technique, the right knee was injected with 80mg of Kenalog, 4cc of 1% lidocaine, 4cc of 0.25% marcaine using a 21-gauge needle. A sterile dressing was applied. Post injection instructions were reviewed. The patient tolerated the procedure well.\par \par

## 2023-01-06 NOTE — PHYSICAL EXAM
[de-identified] : Patient is well nourished, well-developed, in no acute distress, with appropriate mood and affect. The patient is oriented to time, place, and person. Respirations are even and unlabored. Gait evaluation does reveal a limp. There is no inguinal adenopathy. Examination of the contralateral knee shows normal range of motion, strength, no tenderness, and intact skin. The affected limb is well-perfused, without skin lesions, shows a grossly normal motor and sensory examination. Knee motion is significantly reduced and does cause significant pain. The knee moves from 0-110 degrees. The knee is stable within that range-of-motion to AP and ML stress. The alignment of the knee is 10 degrees varus. Muscle strength is normal. Pedal pulses are palpable. Hip examination was negative.\par  [de-identified] : Long standing knee, AP knee, lateral knee, and patellar views of the right knee were ordered and taken in the office and demonstrate severe bone-on-bone degenerative joint disease of the knee with joint space narrowing, osteophyte formation, and subchondral sclerosis.

## 2023-12-07 ENCOUNTER — APPOINTMENT (OUTPATIENT)
Dept: GASTROENTEROLOGY | Facility: CLINIC | Age: 68
End: 2023-12-07

## 2024-08-22 ENCOUNTER — APPOINTMENT (OUTPATIENT)
Dept: GASTROENTEROLOGY | Facility: CLINIC | Age: 69
End: 2024-08-22
Payer: MEDICARE

## 2024-08-22 VITALS — BODY MASS INDEX: 31.18 KG/M2 | OXYGEN SATURATION: 97 % | WEIGHT: 176 LBS | HEIGHT: 63 IN | HEART RATE: 80 BPM

## 2024-08-22 DIAGNOSIS — Z86.010 PERSONAL HISTORY OF COLONIC POLYPS: ICD-10-CM

## 2024-08-22 DIAGNOSIS — F41.9 ANXIETY DISORDER, UNSPECIFIED: ICD-10-CM

## 2024-08-22 DIAGNOSIS — K86.2 CYST OF PANCREAS: ICD-10-CM

## 2024-08-22 PROCEDURE — 99204 OFFICE O/P NEW MOD 45 MIN: CPT

## 2024-08-22 RX ORDER — ALPRAZOLAM 0.5 MG/1
0.5 TABLET ORAL AS DIRECTED
Qty: 2 | Refills: 0 | Status: ACTIVE | COMMUNITY
Start: 2024-08-22 | End: 1900-01-01

## 2024-08-22 NOTE — ASSESSMENT
[FreeTextEntry1] : Pancreatic cysts Of note, his mother  of pancreatic cancer Will schedule MRCP to follow-up  The patient expresses his concern about anxiety while in the MRI machine Will prescribe Ativan to take 1 hour prior to the MRI  History of colon polyps Next colonoscopy   Answered all questions

## 2024-08-23 DIAGNOSIS — I10 ESSENTIAL (PRIMARY) HYPERTENSION: ICD-10-CM

## 2024-09-06 ENCOUNTER — APPOINTMENT (OUTPATIENT)
Dept: MRI IMAGING | Facility: CLINIC | Age: 69
End: 2024-09-06

## 2024-09-06 ENCOUNTER — OUTPATIENT (OUTPATIENT)
Dept: OUTPATIENT SERVICES | Facility: HOSPITAL | Age: 69
LOS: 1 days | End: 2024-09-06
Payer: COMMERCIAL

## 2024-09-06 DIAGNOSIS — Z41.9 ENCOUNTER FOR PROCEDURE FOR PURPOSES OTHER THAN REMEDYING HEALTH STATE, UNSPECIFIED: Chronic | ICD-10-CM

## 2024-09-06 DIAGNOSIS — K86.2 CYST OF PANCREAS: ICD-10-CM

## 2024-09-06 DIAGNOSIS — Z90.89 ACQUIRED ABSENCE OF OTHER ORGANS: Chronic | ICD-10-CM

## 2024-09-06 PROCEDURE — 74183 MRI ABD W/O CNTR FLWD CNTR: CPT

## 2024-09-06 PROCEDURE — A9585: CPT

## 2024-09-06 PROCEDURE — 74183 MRI ABD W/O CNTR FLWD CNTR: CPT | Mod: 26,MH

## 2024-09-23 ENCOUNTER — NON-APPOINTMENT (OUTPATIENT)
Age: 69
End: 2024-09-23

## 2024-09-23 DIAGNOSIS — K86.2 CYST OF PANCREAS: ICD-10-CM

## 2024-10-15 NOTE — ASU PATIENT PROFILE, ADULT - FALL HARM RISK - UNIVERSAL INTERVENTIONS
Bed in lowest position, wheels locked, appropriate side rails in place/Call bell, personal items and telephone in reach/Instruct patient to call for assistance before getting out of bed or chair/Non-slip footwear when patient is out of bed/[place] to call system/Physically safe environment - no spills, clutter or unnecessary equipment/Purposeful Proactive Rounding/Room/bathroom lighting operational, light cord in reach

## 2024-10-15 NOTE — ASU PATIENT PROFILE, ADULT - NSICDXPASTSURGICALHX_GEN_ALL_CORE_FT
PAST SURGICAL HISTORY:  S/P tonsillectomy     Surgery, elective Parathyroidectomy 2002    Surgery, elective broken leg as a children    Surgery, elective kidney stone -stent

## 2024-10-16 ENCOUNTER — RESULT REVIEW (OUTPATIENT)
Age: 69
End: 2024-10-16

## 2024-10-16 ENCOUNTER — NON-APPOINTMENT (OUTPATIENT)
Age: 69
End: 2024-10-16

## 2024-10-16 ENCOUNTER — TRANSCRIPTION ENCOUNTER (OUTPATIENT)
Age: 69
End: 2024-10-16

## 2024-10-16 ENCOUNTER — OUTPATIENT (OUTPATIENT)
Dept: OUTPATIENT SERVICES | Facility: HOSPITAL | Age: 69
LOS: 1 days | Discharge: HOME | End: 2024-10-16
Payer: MEDICARE

## 2024-10-16 ENCOUNTER — APPOINTMENT (OUTPATIENT)
Dept: GASTROENTEROLOGY | Facility: HOSPITAL | Age: 69
End: 2024-10-16

## 2024-10-16 VITALS
TEMPERATURE: 98 F | DIASTOLIC BLOOD PRESSURE: 98 MMHG | HEART RATE: 85 BPM | OXYGEN SATURATION: 99 % | WEIGHT: 164.91 LBS | SYSTOLIC BLOOD PRESSURE: 157 MMHG | HEIGHT: 63.5 IN | RESPIRATION RATE: 10 BRPM

## 2024-10-16 VITALS
HEART RATE: 83 BPM | DIASTOLIC BLOOD PRESSURE: 89 MMHG | SYSTOLIC BLOOD PRESSURE: 144 MMHG | OXYGEN SATURATION: 96 % | RESPIRATION RATE: 20 BRPM

## 2024-10-16 DIAGNOSIS — K86.2 CYST OF PANCREAS: ICD-10-CM

## 2024-10-16 DIAGNOSIS — Z41.9 ENCOUNTER FOR PROCEDURE FOR PURPOSES OTHER THAN REMEDYING HEALTH STATE, UNSPECIFIED: Chronic | ICD-10-CM

## 2024-10-16 DIAGNOSIS — Z90.89 ACQUIRED ABSENCE OF OTHER ORGANS: Chronic | ICD-10-CM

## 2024-10-16 LAB
GLUCOSE BLDC GLUCOMTR-MCNC: 154 MG/DL — HIGH (ref 70–99)
GLUCOSE BLDC GLUCOMTR-MCNC: 164 MG/DL — HIGH (ref 70–99)

## 2024-10-16 PROCEDURE — 88173 CYTOPATH EVAL FNA REPORT: CPT | Mod: 26

## 2024-10-16 PROCEDURE — 43242 EGD US FINE NEEDLE BX/ASPIR: CPT | Mod: GC

## 2024-10-16 RX ORDER — CARVEDILOL 3.125 MG
1 TABLET ORAL
Refills: 0 | DISCHARGE

## 2024-10-16 NOTE — PRE PROCEDURE NOTE - PRE PROCEDURE EVALUATION
Attending Physician:  Dr. Carft    Procedure: EUS with FNA    Indication for Procedure: On MRCP found 2 cystic lesion in uncinate process, one 1.7 (from 1.4 cm previously) other 9 mm.    ASA Class: I [ ]  II [ ]  III [x ]  IV [ ]  ________________________________________________________  PAST MEDICAL & SURGICAL HISTORY:  HTN (hypertension)      DM (diabetes mellitus)      Atrial fibrillation      Surgery, elective  Parathyroidectomy 2002      S/P tonsillectomy      Surgery, elective  broken leg as a children      Surgery, elective  kidney stone -stent        ALLERGIES:  Septra (Rash)  sulfa drugs (Unknown)    HOME MEDICATIONS:  amlodipine-benazepril 10 mg-40 mg oral capsule: 1 cap(s) orally once a day  carvedilol 12.5 mg oral tablet: 1 tab(s) orally 2 times a day  Eliquis 5 mg oral tablet: 1 tab(s) orally 2 times a day  Lipitor 10 mg oral tablet: 1 tab(s) orally once a day  metFORMIN 500 mg oral tablet: 1 tab(s) orally 2 times a day    AICD/PPM: [ ] yes   [ ] no    PERTINENT LAB DATA:                      PHYSICAL EXAMINATION:    Height (cm): 161.3  Weight (kg): 74.8  BMI (kg/m2): 28.7  BSA (m2): 1.79T(C): 36.5  HR: 85  BP: 157/98  RR: 10  SpO2: 99%    Constitutional: NAD  HEENT: PERRLA, EOMI,    Neck:  No JVD  Respiratory: No visible respiratory issues  Cardiovascular: S1 and S2 on telemetri  Gastrointestinal: BS+, soft, NT/ND  Extremities: No peripheral edema  Neurological: A/O x 3, no focal deficits  Psychiatric: Normal mood, normal affect  Skin: No rashes        COMMENTS:    The patient is a suitable candidate for the planned procedure unless box checked [ ]  No, explain:    Risks and alternatives to the procedure discussed in detail. All questions answered. Pt seen f/u for mood and psychosis and noncompliance with substance use disorders

## 2024-10-16 NOTE — ASU DISCHARGE PLAN (ADULT/PEDIATRIC) - FINANCIAL ASSISTANCE
[facility] provides services at a reduced cost to those who are determined to be eligible through [facility]’s financial assistance program. Information regarding [facility]’s financial assistance program can be found by going to [email or web address] or by calling [phone].

## 2024-10-16 NOTE — ASU DISCHARGE PLAN (ADULT/PEDIATRIC) - NS MD DC FALL RISK RISK
For information on Fall & Injury Prevention, visit: [email or web address] OR  [email or web address] OR  https://www.cdc.gov/steadi/patient.html

## 2024-10-22 LAB — NON-GYNECOLOGICAL CYTOLOGY STUDY: SIGNIFICANT CHANGE UP

## 2024-10-28 NOTE — CHART NOTE - NSCHARTNOTEFT_GEN_A_CORE
I emailed Dr. Claire and called patient on 10/28/24 about EUS FNA results.  Pancreatic Head Cyst Fluid  CEA 19ng/mL  Amylase 158,711U/L  Glucose less than 4.3mg/dL'  Cytology negative    This is consistent with pancreatic pseudocyst. No follow up or repeat imaging is indicated at this time.

## 2025-09-15 ENCOUNTER — APPOINTMENT (OUTPATIENT)
Dept: OTOLARYNGOLOGY | Facility: CLINIC | Age: 70
End: 2025-09-15

## (undated) DEVICE — CATH IV SAFE BC 22G X 1" (BLUE)

## (undated) DEVICE — DRSG BANDAID 0.75X3"

## (undated) DEVICE — NDL ASPIR EXPECT SLIMLINE 22G

## (undated) DEVICE — ELCTR ECG CONDUCTIVE ADHESIVE

## (undated) DEVICE — TUBING MEDI-VAC W MAXIGRIP CONNECTORS 1/4"X6'

## (undated) DEVICE — SALIVA EJECTOR (BLUE)

## (undated) DEVICE — BASIN EMESIS 10IN GRADUATED MAUVE

## (undated) DEVICE — CONTAINER FORMALIN 80ML YELLOW

## (undated) DEVICE — BIOPSY FORCEP RADIAL JAW 4 STANDARD WITH NEEDLE

## (undated) DEVICE — GOWN LG

## (undated) DEVICE — TUBING IV SET GRAVITY 3Y 100" MACRO

## (undated) DEVICE — BIOPSY FORCEP COLD DISP

## (undated) DEVICE — PACK IV START WITH CHG

## (undated) DEVICE — DRSG CURITY GAUZE SPONGE 4 X 4" 12-PLY NON-STERILE

## (undated) DEVICE — UNDERPAD LINEN SAVER 17 X 24"

## (undated) DEVICE — DENTURE CUP PINK

## (undated) DEVICE — BITE BLOCK ADULT 20 X 27MM (GREEN)

## (undated) DEVICE — LUBRICATING JELLY HR ONE SHOT 3G

## (undated) DEVICE — CLAMP BX HOT RAD JAW 3

## (undated) DEVICE — DRSG 2X2